# Patient Record
Sex: FEMALE | URBAN - METROPOLITAN AREA
[De-identification: names, ages, dates, MRNs, and addresses within clinical notes are randomized per-mention and may not be internally consistent; named-entity substitution may affect disease eponyms.]

---

## 2017-02-09 ENCOUNTER — IMPORTED ENCOUNTER (OUTPATIENT)
Dept: URBAN - METROPOLITAN AREA CLINIC 43 | Facility: CLINIC | Age: 77
End: 2017-02-09

## 2017-02-09 PROBLEM — H40.013: Noted: 2017-02-09

## 2017-06-12 ENCOUNTER — IMPORTED ENCOUNTER (OUTPATIENT)
Dept: URBAN - METROPOLITAN AREA CLINIC 43 | Facility: CLINIC | Age: 77
End: 2017-06-12

## 2017-06-12 PROBLEM — H40.013: Noted: 2017-06-12

## 2017-06-30 ENCOUNTER — IMPORTED ENCOUNTER (OUTPATIENT)
Dept: URBAN - METROPOLITAN AREA CLINIC 43 | Facility: CLINIC | Age: 77
End: 2017-06-30

## 2017-06-30 PROBLEM — H35.3131: Noted: 2017-06-30

## 2017-06-30 PROBLEM — H16.223: Noted: 2017-06-30

## 2017-06-30 PROBLEM — H43.813: Noted: 2017-06-30

## 2017-06-30 PROBLEM — H40.013: Noted: 2017-06-30

## 2018-01-10 ENCOUNTER — IMPORTED ENCOUNTER (OUTPATIENT)
Dept: URBAN - METROPOLITAN AREA CLINIC 43 | Facility: CLINIC | Age: 78
End: 2018-01-10

## 2018-01-10 PROBLEM — H35.3131: Noted: 2018-01-10

## 2018-01-10 PROBLEM — H40.013: Noted: 2018-01-10

## 2018-06-22 ENCOUNTER — IMPORTED ENCOUNTER (OUTPATIENT)
Dept: URBAN - METROPOLITAN AREA CLINIC 43 | Facility: CLINIC | Age: 78
End: 2018-06-22

## 2018-06-22 PROBLEM — H40.013: Noted: 2018-06-22

## 2018-12-07 ENCOUNTER — IMPORTED ENCOUNTER (OUTPATIENT)
Dept: URBAN - METROPOLITAN AREA CLINIC 43 | Facility: CLINIC | Age: 78
End: 2018-12-07

## 2018-12-07 PROBLEM — H40.013: Noted: 2018-12-07

## 2018-12-07 PROBLEM — H35.3131: Noted: 2018-12-07

## 2019-12-05 ENCOUNTER — PREPPED CHART (OUTPATIENT)
Dept: URBAN - METROPOLITAN AREA CLINIC 32 | Facility: CLINIC | Age: 79
End: 2019-12-05

## 2019-12-05 ASSESSMENT — PACHYMETRY
OS_CT_UM: 567
OD_CT_UM: 585

## 2019-12-05 ASSESSMENT — VISUAL ACUITY
OS_CC: J1
OD_CC: J1+-3
OD_CC: 20/20
OS_CC: 20/25+1

## 2019-12-05 ASSESSMENT — TONOMETRY
OS_IOP_MMHG: 16
OD_IOP_MMHG: 16

## 2019-12-06 ENCOUNTER — ESTABLISHED PATIENT (OUTPATIENT)
Dept: URBAN - METROPOLITAN AREA CLINIC 32 | Facility: CLINIC | Age: 79
End: 2019-12-06

## 2019-12-06 DIAGNOSIS — H40.013: ICD-10-CM

## 2019-12-06 PROCEDURE — G9903 PT SCRN TBCO ID AS NON USER: HCPCS

## 2019-12-06 PROCEDURE — 1036F TOBACCO NON-USER: CPT

## 2019-12-06 PROCEDURE — 4040F PNEUMOC VAC/ADMIN/RCVD: CPT

## 2019-12-06 PROCEDURE — G8428 CUR MEDS NOT DOCUMENT: HCPCS

## 2019-12-06 PROCEDURE — 92133 CPTRZD OPH DX IMG PST SGM ON: CPT

## 2019-12-06 PROCEDURE — G8756 NO BP MEASURE DOC: HCPCS

## 2019-12-06 PROCEDURE — 92014 COMPRE OPH EXAM EST PT 1/>: CPT

## 2019-12-06 PROCEDURE — G9744 PT NOT ELI D/T ACT DIG HTN: HCPCS

## 2019-12-06 PROCEDURE — G8482 FLU IMMUNIZE ORDER/ADMIN: HCPCS

## 2019-12-06 PROCEDURE — 0517F GLAUCOMA PLAN OF CARE DOCD: CPT

## 2019-12-06 ASSESSMENT — VISUAL ACUITY
OD_CC: 20/20
OS_CC: 20/25

## 2019-12-06 ASSESSMENT — TONOMETRY
OS_IOP_MMHG: 16
OD_IOP_MMHG: 15

## 2020-01-13 NOTE — PATIENT DISCUSSION
CATARACTS, OU - VISUALLY SIGNIFICANT. PT TO CALL WHEN READY TO PROCEED WITH SURGERY. ATROPINE PRE OP. DO NOT RECOMMEND SX WHILE A1C/BSL IS HIGH.

## 2020-04-19 ASSESSMENT — TONOMETRY
OD_IOP_MMHG: 14.0
OS_IOP_MMHG: 17.0
OD_IOP_MMHG: 13.0
OS_IOP_MMHG: 16.0
OS_IOP_MMHG: 16.0
OS_IOP_MMHG: 17.0
OD_IOP_MMHG: 16.0
OS_IOP_MMHG: 15.0
OS_IOP_MMHG: 15.0
OD_IOP_MMHG: 15.0
OD_IOP_MMHG: 16.0
OD_IOP_MMHG: 14.0

## 2020-04-19 ASSESSMENT — VISUAL ACUITY
OD_CC: 20/20
OD_CC: J1+-3
OS_CC: J1
OS_CC: 20/20
OD_CC: 20/20 -1
OS_CC: 20/20 -2
OS_CC: 20/20
OD_CC: 20/20 -3
OS_CC: 20/20-1
OD_CC: 20/20
OS_CC: 20/20-3
OD_CC: 20/20
OD_CC: 20/20
OD_CC: 20/20 -3
OS_CC: 20/20
OD_CC: 20/20
OS_CC: 20/25 +1
OS_CC: 25-2+2
OS_CC: 20/20
OS_CC: 25-2+2

## 2020-04-19 ASSESSMENT — KERATOMETRY
OD_AXISANGLE2_DEGREES: 110
OD_K1POWER_DIOPTERS: 43.25
OS_AXISANGLE2_DEGREES: 105
OS_AXISANGLE_DEGREES: 15
OS_K1POWER_DIOPTERS: 43.25
OD_AXISANGLE_DEGREES: 20
OD_K2POWER_DIOPTERS: 43
OS_K2POWER_DIOPTERS: 42.75

## 2020-12-11 ENCOUNTER — ESTABLISHED COMPREHENSIVE EXAM (OUTPATIENT)
Dept: URBAN - METROPOLITAN AREA CLINIC 32 | Facility: CLINIC | Age: 80
End: 2020-12-11

## 2020-12-11 DIAGNOSIS — H40.013: ICD-10-CM

## 2020-12-11 DIAGNOSIS — H35.3131: ICD-10-CM

## 2020-12-11 PROCEDURE — 92134 CPTRZ OPH DX IMG PST SGM RTA: CPT

## 2020-12-11 PROCEDURE — 92014 COMPRE OPH EXAM EST PT 1/>: CPT

## 2020-12-11 ASSESSMENT — KERATOMETRY
OD_AXISANGLE2_DEGREES: 69
OD_AXISANGLE_DEGREES: 159
OS_AXISANGLE_DEGREES: 130
OD_K2POWER_DIOPTERS: 42.75
OD_K1POWER_DIOPTERS: 43.00
OS_K1POWER_DIOPTERS: 43.00
OS_AXISANGLE2_DEGREES: 40
OS_K2POWER_DIOPTERS: 42.50

## 2020-12-11 ASSESSMENT — VISUAL ACUITY
OS_CC: J1+2
OU_CC: 20/20-1
OS_CC: 20/25-1
OD_CC: 20/25+2
OD_CC: J1+
OU_CC: J1+

## 2020-12-11 ASSESSMENT — TONOMETRY
OD_IOP_MMHG: 12
OS_IOP_MMHG: 13

## 2021-12-13 ASSESSMENT — KERATOMETRY
OS_K1POWER_DIOPTERS: 43.00
OD_AXISANGLE2_DEGREES: 69
OD_K1POWER_DIOPTERS: 43.00
OS_AXISANGLE_DEGREES: 130
OS_K2POWER_DIOPTERS: 42.50
OD_AXISANGLE_DEGREES: 159
OD_K2POWER_DIOPTERS: 42.75
OS_AXISANGLE2_DEGREES: 40

## 2021-12-14 ENCOUNTER — COMPREHENSIVE EXAM (OUTPATIENT)
Dept: URBAN - METROPOLITAN AREA CLINIC 32 | Facility: CLINIC | Age: 81
End: 2021-12-14

## 2021-12-14 DIAGNOSIS — H35.3131: ICD-10-CM

## 2021-12-14 DIAGNOSIS — H40.013: ICD-10-CM

## 2021-12-14 DIAGNOSIS — Z96.1: ICD-10-CM

## 2021-12-14 PROCEDURE — 92134 CPTRZ OPH DX IMG PST SGM RTA: CPT

## 2021-12-14 PROCEDURE — 92014 COMPRE OPH EXAM EST PT 1/>: CPT

## 2021-12-14 PROCEDURE — 92015 DETERMINE REFRACTIVE STATE: CPT

## 2021-12-14 ASSESSMENT — VISUAL ACUITY
OS_CC: J1
OD_CC: J1
OD_CC: 20/25
OS_CC: 20/25

## 2021-12-14 ASSESSMENT — TONOMETRY
OS_IOP_MMHG: 14
OD_IOP_MMHG: 11

## 2022-06-04 ENCOUNTER — TELEPHONE ENCOUNTER (OUTPATIENT)
Dept: URBAN - METROPOLITAN AREA CLINIC 68 | Facility: CLINIC | Age: 82
End: 2022-06-04

## 2022-06-05 ENCOUNTER — TELEPHONE ENCOUNTER (OUTPATIENT)
Dept: URBAN - METROPOLITAN AREA CLINIC 68 | Facility: CLINIC | Age: 82
End: 2022-06-05

## 2022-06-25 ENCOUNTER — TELEPHONE ENCOUNTER (OUTPATIENT)
Age: 82
End: 2022-06-25

## 2022-06-26 ENCOUNTER — TELEPHONE ENCOUNTER (OUTPATIENT)
Age: 82
End: 2022-06-26

## 2022-12-15 ENCOUNTER — COMPREHENSIVE EXAM (OUTPATIENT)
Dept: URBAN - METROPOLITAN AREA CLINIC 32 | Facility: CLINIC | Age: 82
End: 2022-12-15

## 2022-12-15 PROCEDURE — 92133 CPTRZD OPH DX IMG PST SGM ON: CPT

## 2022-12-15 PROCEDURE — 92134 CPTRZ OPH DX IMG PST SGM RTA: CPT

## 2022-12-15 PROCEDURE — 92014 COMPRE OPH EXAM EST PT 1/>: CPT

## 2022-12-15 PROCEDURE — 92015 DETERMINE REFRACTIVE STATE: CPT

## 2022-12-15 ASSESSMENT — VISUAL ACUITY
OS_CC: 20/30+2
OD_CC: J1
OD_CC: 20/25-2
OS_CC: J5

## 2022-12-15 ASSESSMENT — KERATOMETRY
OS_AXISANGLE2_DEGREES: 53
OS_K2POWER_DIOPTERS: 42.25
OD_K2POWER_DIOPTERS: 42.50
OD_K1POWER_DIOPTERS: 43.00
OS_K1POWER_DIOPTERS: 42.75
OD_AXISANGLE2_DEGREES: 82
OS_AXISANGLE_DEGREES: 143
OD_AXISANGLE_DEGREES: 172

## 2022-12-15 ASSESSMENT — TONOMETRY
OS_IOP_MMHG: 11
OD_IOP_MMHG: 12

## 2023-12-18 ENCOUNTER — COMPREHENSIVE EXAM (OUTPATIENT)
Dept: URBAN - METROPOLITAN AREA CLINIC 32 | Facility: CLINIC | Age: 83
End: 2023-12-18

## 2023-12-18 DIAGNOSIS — H43.813: ICD-10-CM

## 2023-12-18 DIAGNOSIS — H40.013: ICD-10-CM

## 2023-12-18 DIAGNOSIS — H35.3131: ICD-10-CM

## 2023-12-18 PROCEDURE — 99214 OFFICE O/P EST MOD 30 MIN: CPT

## 2023-12-18 PROCEDURE — 92250 FUNDUS PHOTOGRAPHY W/I&R: CPT

## 2023-12-18 ASSESSMENT — KERATOMETRY
OS_K2POWER_DIOPTERS: 42.50
OS_K1POWER_DIOPTERS: 42.75
OS_AXISANGLE2_DEGREES: 39
OD_AXISANGLE_DEGREES: 163
OD_K2POWER_DIOPTERS: 42.50
OS_AXISANGLE_DEGREES: 129
OD_K1POWER_DIOPTERS: 43.00
OD_AXISANGLE2_DEGREES: 73

## 2023-12-18 ASSESSMENT — VISUAL ACUITY
OS_SC: J16
OS_CC: J2
OS_CC: 20/25
OD_SC: 20/30
OD_SC: J16-1
OS_SC: 20/30
OD_CC: J1
OD_CC: 20/20-1

## 2023-12-18 ASSESSMENT — TONOMETRY
OS_IOP_MMHG: 12
OD_IOP_MMHG: 12

## 2025-01-08 ENCOUNTER — COMPREHENSIVE EXAM (OUTPATIENT)
Age: 85
End: 2025-01-08

## 2025-01-08 DIAGNOSIS — H35.033: ICD-10-CM

## 2025-01-08 DIAGNOSIS — H35.3131: ICD-10-CM

## 2025-01-08 DIAGNOSIS — Z96.1: ICD-10-CM

## 2025-01-08 DIAGNOSIS — H43.813: ICD-10-CM

## 2025-01-08 DIAGNOSIS — H16.223: ICD-10-CM

## 2025-01-08 DIAGNOSIS — H40.013: ICD-10-CM

## 2025-01-08 PROCEDURE — 92014 COMPRE OPH EXAM EST PT 1/>: CPT

## 2025-01-08 PROCEDURE — 92250 FUNDUS PHOTOGRAPHY W/I&R: CPT

## 2025-01-29 ENCOUNTER — DASHBOARD ENCOUNTERS (OUTPATIENT)
Age: 85
End: 2025-01-29

## 2025-01-29 ENCOUNTER — LAB OUTSIDE AN ENCOUNTER (OUTPATIENT)
Dept: URBAN - METROPOLITAN AREA CLINIC 68 | Facility: CLINIC | Age: 85
End: 2025-01-29

## 2025-01-29 ENCOUNTER — OFFICE VISIT (OUTPATIENT)
Dept: URBAN - METROPOLITAN AREA CLINIC 68 | Facility: CLINIC | Age: 85
End: 2025-01-29
Payer: COMMERCIAL

## 2025-01-29 VITALS
WEIGHT: 105 LBS | SYSTOLIC BLOOD PRESSURE: 118 MMHG | OXYGEN SATURATION: 98 % | HEIGHT: 64 IN | HEART RATE: 71 BPM | BODY MASS INDEX: 17.93 KG/M2 | DIASTOLIC BLOOD PRESSURE: 80 MMHG

## 2025-01-29 DIAGNOSIS — R13.19 ESOPHAGEAL DYSPHAGIA: ICD-10-CM

## 2025-01-29 PROCEDURE — 99204 OFFICE O/P NEW MOD 45 MIN: CPT | Performed by: INTERNAL MEDICINE

## 2025-01-29 NOTE — HPI-TODAY'S VISIT:
84 y.o. WF with a history of seasonal allergies and sinusitis who is here for evaluation of episodic dysphagia and globus who is here for evaluation. She feels that when her allergies flare up then she has increased mucus and this can lead to globus and dysphagia. She denies any food bolus impaction, no weight loss, no unintentional weight loss. She defers an EGD for evaluation. She understands to consider a modified barium swallow and esophagram.